# Patient Record
Sex: FEMALE | Race: ASIAN
[De-identification: names, ages, dates, MRNs, and addresses within clinical notes are randomized per-mention and may not be internally consistent; named-entity substitution may affect disease eponyms.]

---

## 2020-01-05 ENCOUNTER — HOSPITAL ENCOUNTER (EMERGENCY)
Dept: HOSPITAL 74 - JER | Age: 40
Discharge: HOME | End: 2020-01-05
Payer: COMMERCIAL

## 2020-01-05 VITALS — TEMPERATURE: 97.9 F

## 2020-01-05 VITALS — DIASTOLIC BLOOD PRESSURE: 75 MMHG | HEART RATE: 89 BPM | SYSTOLIC BLOOD PRESSURE: 107 MMHG

## 2020-01-05 VITALS — BODY MASS INDEX: 23.4 KG/M2

## 2020-01-05 DIAGNOSIS — N39.0: Primary | ICD-10-CM

## 2020-01-05 LAB
ALBUMIN SERPL-MCNC: 3.6 G/DL (ref 3.4–5)
ALP SERPL-CCNC: 73 U/L (ref 45–117)
ALT SERPL-CCNC: 27 U/L (ref 13–61)
ANION GAP SERPL CALC-SCNC: 9 MMOL/L (ref 8–16)
APPEARANCE UR: CLEAR
AST SERPL-CCNC: 31 U/L (ref 15–37)
BACTERIA # UR AUTO: 203.2 /HPF
BASOPHILS # BLD: 0.9 % (ref 0–2)
BILIRUB SERPL-MCNC: 0.4 MG/DL (ref 0.2–1)
BILIRUB UR STRIP.AUTO-MCNC: NEGATIVE MG/DL
BUN SERPL-MCNC: 10.6 MG/DL (ref 7–18)
CALCIUM SERPL-MCNC: 8.9 MG/DL (ref 8.5–10.1)
CASTS URNS QL MICRO: 1 /LPF (ref 0–8)
CHLORIDE SERPL-SCNC: 108 MMOL/L (ref 98–107)
CO2 SERPL-SCNC: 23 MMOL/L (ref 21–32)
COLOR UR: YELLOW
CREAT SERPL-MCNC: 0.7 MG/DL (ref 0.55–1.3)
DEPRECATED RDW RBC AUTO: 12.4 % (ref 11.6–15.6)
EOSINOPHIL # BLD: 1.5 % (ref 0–4.5)
EPITH CASTS URNS QL MICRO: 1.8 /HPF
GLUCOSE SERPL-MCNC: 103 MG/DL (ref 74–106)
HCT VFR BLD CALC: 35.4 % (ref 32.4–45.2)
HGB BLD-MCNC: 11.9 GM/DL (ref 10.7–15.3)
KETONES UR QL STRIP: NEGATIVE
LEUKOCYTE ESTERASE UR QL STRIP.AUTO: (no result)
LYMPHOCYTES # BLD: 30.1 % (ref 8–40)
MAGNESIUM SERPL-MCNC: 2.1 MG/DL (ref 1.8–2.4)
MCH RBC QN AUTO: 30.2 PG (ref 25.7–33.7)
MCHC RBC AUTO-ENTMCNC: 33.7 G/DL (ref 32–36)
MCV RBC: 89.6 FL (ref 80–96)
MONOCYTES # BLD AUTO: 7.8 % (ref 3.8–10.2)
NEUTROPHILS # BLD: 59.7 % (ref 42.8–82.8)
NITRITE UR QL STRIP: NEGATIVE
PH UR: 5.5 [PH] (ref 5–8)
PLATELET # BLD AUTO: 243 K/MM3 (ref 134–434)
PMV BLD: 9.1 FL (ref 7.5–11.1)
POTASSIUM SERPLBLD-SCNC: 4.4 MMOL/L (ref 3.5–5.1)
PROT SERPL-MCNC: 7.1 G/DL (ref 6.4–8.2)
PROT UR QL STRIP: NEGATIVE
PROT UR QL STRIP: NEGATIVE
RBC # BLD AUTO: 2 /HPF (ref 0–4)
RBC # BLD AUTO: 3.95 M/MM3 (ref 3.6–5.2)
SODIUM SERPL-SCNC: 140 MMOL/L (ref 136–145)
SP GR UR: 1.01 (ref 1.01–1.03)
UROBILINOGEN UR STRIP-MCNC: 0.2 MG/DL (ref 0.2–1)
WBC # BLD AUTO: 8.9 K/MM3 (ref 4–10)
WBC # UR AUTO: 40 /HPF (ref 0–5)

## 2020-01-05 NOTE — PDOC
History of Present Illness





- General


Chief Complaint: Palpitations


Stated Complaint: PALPITATIONS


Time Seen by Provider: 01/05/20 03:03


History Source: Patient, Family


Exam Limitations: No Limitations





- History of Present Illness


Initial Comments: 





01/05/20 04:47


39YOF without PMH who p/w one day of sensation of rapid palpitations unlike 

symptoms she has ever had before. Her family is present to assist translating 

and provide her history. They note that at about 1:45 am today, she began 

screaming and saying that she felt like she was going to die because of the 

palpitations. Family note that when they tried to help console her, she seemed 

to become less responsive for a short period of time but never lost 

consciousness. They otherwise deny any f/c/n/v/d/c, change in appetite, cough, 

congestion, CP, AP, back pain, neck pain, change in baseline stress level, or 

any other new symptoms.





Past History





- Past Medical History


Allergies/Adverse Reactions: 


 Allergies











Allergy/AdvReac Type Severity Reaction Status Date / Time


 


No Known Allergies Allergy   Verified 01/05/20 04:29











Home Medications: 


Ambulatory Orders





Cephalexin [Keflex] 500 mg PO BID #13 capsule 01/05/20 








COPD: No


Other medical history: Pt denies





- Psycho Social/Smoking Cessation Hx


Smoking History: Never smoked


Have you smoked in the past 12 months: No


Information on smoking cessation initiated: No


Hx Alcohol Use: No


Drug/Substance Use Hx: No





**Review of Systems





- Review of Systems


Able to Perform ROS?: Yes


Comments:: 





01/05/20 04:53


GEN: no fever, chills, malaise, or generalized weakness


HEENT: no ear pain, congestion, sore throat, vision change, or eye pain


CV: palpitations, no chest pain, lightheadedness, syncope, or edema


RESP: no SOB, wheezing, or cough


GI: no abdominal pain, nausea, vomiting, diarrhea, constipation, or rectal bleed


: no dysuria, hematuria, or discharge 


MSK: no muscle weakness or pain, no joint swelling or pain


NEURO: no headache, vertigo, numbness, tingling, or focal weakness


PSYCH: no SI, HI, or behavior change


SKIN: no jaundice, rash, lesions, or unexplained bruises


ROS otherwise negative except as noted in HPI





*Physical Exam





- Vital Signs


 Last Vital Signs











Temp Pulse Resp BP Pulse Ox


 


 97.9 F   89   16   107/75   97 


 


 01/05/20 02:40  01/05/20 06:07  01/05/20 06:07  01/05/20 06:07  01/05/20 06:07














- Physical Exam





01/05/20 04:54


GENERAL: a bit dehydrated appearing, but A/Ox4, no distress, answers questions 

appropriately, accompanied by  and son who appear supportive


HEENT: PERRLA, EOMI, moist mucous membranes


NECK/BACK: no midline ttp, no spinal stepoff or deformity, no hematoma, full ROM

, neck supple


CARDIOVASCULAR: regular rate/rhythm, no MGR, strong peripheral pulses, 

capillary refill <2 seconds, extremities wwp, no edema


LUNGS/RESPIRATORY: no respiratory distress, CTAB


GI/ABDOMEN: symmetric side-to-side, normoactive BS, soft, no ttp, no midline 

pulsatile masses


: no CVA tenderness


EXTREMITIES: no muscle atrophy, no acute deformity


SKIN: warm and dry, a bit pale, no jaundice, no rash, no bruising, no skin 

breakdown, no cuts, no lesions


NEUROLOGICAL: GCS 15, CN II-XII grossly intact, 5/5 strength proximally and 

distally, no facial droop





**Heart Score/ECG Review


  ** #1





01/05/20 02:44


Sinus rhythm, rate 100, normal axis and intervals, TWF III, no other ST-T 

changes





ED Treatment Course





- LABORATORY


CBC & Chemistry Diagram: 


 01/05/20 04:41





 01/05/20 03:51





- ADDITIONAL ORDERS


Additional order review: 


 











  01/05/20 01/05/20 01/05/20





  04:41 03:56 03:51


 


RBC  3.95   Cancelled


 


MCV  89.6   Cancelled


 


MCHC  33.7   Cancelled


 


RDW  12.4   Cancelled


 


MPV  9.1   Cancelled


 


Neutrophils %  59.7   Cancelled


 


Lymphocytes %  30.1   Cancelled


 


Monocytes %  7.8   Cancelled


 


Eosinophils %  1.5   Cancelled


 


Basophils %  0.9   Cancelled


 


POC Glucometer   102 














- RADIOLOGY


Radiology Studies Ordered: 














 Category Date Time Status


 


 CHEST X-RAY PORTABLE* [RAD] Stat Radiology  01/05/20 03:27 Completed














- Medications


Given in the ED: 


ED Medications














Discontinued Medications














Generic Name Dose Route Start Last Admin





  Trade Name Freq  PRN Reason Stop Dose Admin


 


Cephalexin HCl  500 mg  01/05/20 06:33  01/05/20 06:41





  Keflex -  PO  01/05/20 06:34  500 mg





  ONCE ONE   Administration





     





     





     





     


 


Sodium Chloride  1,000 ml  01/05/20 03:28  01/05/20 03:54





  Normal Saline -  IV  01/05/20 03:29  1,000 ml





  ONCE ONE   Administration





     





     





     





     


 


Sodium Chloride  1,000 ml  01/05/20 05:22  01/05/20 05:25





  Normal Saline -  IV  01/05/20 05:23  1,000 ml





  ONCE ONE   Administration





     





     





     





     














Medical Decision Making





- Medical Decision Making





01/05/20 19:12


Adult female Pt p/w sensation of palpitations.





 Initial Vital Signs











Temp Pulse Resp BP Pulse Ox


 


 97.9 F   101 H  20   119/67   97 


 


 01/05/20 02:40  01/05/20 02:40  01/05/20 02:40  01/05/20 02:40  01/05/20 02:40








Exam: As noted in Physical Exam section.


DDX IBNLT: tachyarrhythmia (e.g. SVT, re-entrant tachycardia, WPW, Brugada, 

long QT, AF/AFL w/ RVR, MAT, ventricular dysrhythmia), ischemia (ACS), 

structural heart condition (MVP, mitral stenosis, atrial enlargement, HOCM), 

anxiety/panic, hypoxia, anemia (e.g. hemorrhage from heavy menstruation, 

ruptured ectopic, etc), PE, PTX, bronchitis/PNA, sepsis/shock, tamponade, 

metabolic (e.g. DKA, hypoglycemia), thyroid condition, catecholamine surge (

e.g. pheochromocytoma), anxiety/panic disorder, medication effect, substance use

, etc.


W/U ordered: Labs EKG CXR Monitor


TX ordered: IVF





EKG: Reviewed; results as noted in ECG Review section.


CXR: Nothing acute





 Laboratory Tests











  01/05/20 01/05/20 01/05/20





  03:51 03:51 03:51


 


WBC   Cancelled 


 


Corrected WBC (auto)   Cancelled 


 


RBC   Cancelled 


 


Hgb   Cancelled 


 


Hct   Cancelled 


 


MCV   Cancelled 


 


MCH   Cancelled 


 


MCHC   Cancelled 


 


RDW   Cancelled 


 


Plt Count   Cancelled 


 


MPV   Cancelled 


 


Absolute Neuts (auto)   Cancelled 


 


Absolute Lymphs (auto)   Cancelled 


 


Absolute Monos (auto)   Cancelled 


 


Absolute Eos (auto)   Cancelled 


 


Absolute Basos (auto)   Cancelled 


 


Add Manual Diff   Cancelled 


 


Neutrophils %   Cancelled 


 


Lymphocytes %   Cancelled 


 


Monocytes %   Cancelled 


 


Eosinophils %   Cancelled 


 


Basophils %   Cancelled 


 


Nucleated RBC %   Cancelled 


 


Platelet Estimate   Cancelled 


 


Platelet Comment   Cancelled 


 


Normal RBC Morphology   Cancelled 


 


Sodium  140  


 


Potassium  4.4  


 


Chloride  108 H  


 


Carbon Dioxide  23  


 


Anion Gap  9  


 


BUN  10.6  


 


Creatinine  0.7  


 


Est GFR (CKD-EPI)AfAm  128.28  


 


Est GFR (CKD-EPI)NonAf  110.69  


 


POC Glucometer   


 


Random Glucose  103  


 


Calcium  8.9  


 


Magnesium  2.1  


 


Total Bilirubin  0.4  


 


AST  31  


 


ALT  27  


 


Alkaline Phosphatase  73  


 


Creatine Kinase    142


 


Troponin I    < 0.02


 


Total Protein  7.1  


 


Albumin  3.6  


 


Urine Color   


 


Urine Appearance   


 


Urine pH   


 


Ur Specific Gravity   


 


Urine Protein   


 


Urine Glucose (UA)   


 


Urine Ketones   


 


Urine Blood   


 


Urine Nitrite   


 


Urine Bilirubin   


 


Urine Urobilinogen   


 


Ur Leukocyte Esterase   


 


Urine WBC (Auto)   


 


Urine RBC (Auto)   


 


Urine Casts (Auto)   


 


U Epithel Cells (Auto)   


 


Urine Bacteria (Auto)   


 


Urine HCG, Qual   














  01/05/20 01/05/20 01/05/20





  03:56 04:41 05:28


 


WBC   8.9 


 


Corrected WBC (auto)   


 


RBC   3.95 


 


Hgb   11.9 


 


Hct   35.4 


 


MCV   89.6 


 


MCH   30.2 


 


MCHC   33.7 


 


RDW   12.4 


 


Plt Count   243 


 


MPV   9.1 


 


Absolute Neuts (auto)   5.3 


 


Absolute Lymphs (auto)   


 


Absolute Monos (auto)   


 


Absolute Eos (auto)   


 


Absolute Basos (auto)   


 


Add Manual Diff   


 


Neutrophils %   59.7 


 


Lymphocytes %   30.1 


 


Monocytes %   7.8 


 


Eosinophils %   1.5 


 


Basophils %   0.9 


 


Nucleated RBC %   0 


 


Platelet Estimate   


 


Platelet Comment   


 


Normal RBC Morphology   


 


Sodium   


 


Potassium   


 


Chloride   


 


Carbon Dioxide   


 


Anion Gap   


 


BUN   


 


Creatinine   


 


Est GFR (CKD-EPI)AfAm   


 


Est GFR (CKD-EPI)NonAf   


 


POC Glucometer  102  


 


Random Glucose   


 


Calcium   


 


Magnesium   


 


Total Bilirubin   


 


AST   


 


ALT   


 


Alkaline Phosphatase   


 


Creatine Kinase   


 


Troponin I   


 


Total Protein   


 


Albumin   


 


Urine Color    Yellow


 


Urine Appearance    Clear


 


Urine pH    5.5


 


Ur Specific Gravity    1.008 L


 


Urine Protein    Negative


 


Urine Glucose (UA)    Negative


 


Urine Ketones    Negative


 


Urine Blood    Negative


 


Urine Nitrite    Negative


 


Urine Bilirubin    Negative


 


Urine Urobilinogen    0.2


 


Ur Leukocyte Esterase    2+ H


 


Urine WBC (Auto)    40


 


Urine RBC (Auto)    2


 


Urine Casts (Auto)    1


 


U Epithel Cells (Auto)    1.8


 


Urine Bacteria (Auto)    203.2


 


Urine HCG, Qual   














  01/05/20





  05:28


 


WBC 


 


Corrected WBC (auto) 


 


RBC 


 


Hgb 


 


Hct 


 


MCV 


 


MCH 


 


MCHC 


 


RDW 


 


Plt Count 


 


MPV 


 


Absolute Neuts (auto) 


 


Absolute Lymphs (auto) 


 


Absolute Monos (auto) 


 


Absolute Eos (auto) 


 


Absolute Basos (auto) 


 


Add Manual Diff 


 


Neutrophils % 


 


Lymphocytes % 


 


Monocytes % 


 


Eosinophils % 


 


Basophils % 


 


Nucleated RBC % 


 


Platelet Estimate 


 


Platelet Comment 


 


Normal RBC Morphology 


 


Sodium 


 


Potassium 


 


Chloride 


 


Carbon Dioxide 


 


Anion Gap 


 


BUN 


 


Creatinine 


 


Est GFR (CKD-EPI)AfAm 


 


Est GFR (CKD-EPI)NonAf 


 


POC Glucometer 


 


Random Glucose 


 


Calcium 


 


Magnesium 


 


Total Bilirubin 


 


AST 


 


ALT 


 


Alkaline Phosphatase 


 


Creatine Kinase 


 


Troponin I 


 


Total Protein 


 


Albumin 


 


Urine Color 


 


Urine Appearance 


 


Urine pH 


 


Ur Specific Gravity 


 


Urine Protein 


 


Urine Glucose (UA) 


 


Urine Ketones 


 


Urine Blood 


 


Urine Nitrite 


 


Urine Bilirubin 


 


Urine Urobilinogen 


 


Ur Leukocyte Esterase 


 


Urine WBC (Auto) 


 


Urine RBC (Auto) 


 


Urine Casts (Auto) 


 


U Epithel Cells (Auto) 


 


Urine Bacteria (Auto) 


 


Urine HCG, Qual  Negative











DISCHARGE


The Pt has gotten significant relief of symptoms while in the ED.


She does not have EKG or other workup findings concerning for life-threatening 

arrhythmia.


The Pt is comfortable with this plan and will follow up with her primary care 

provider in 1-3 days.


She will take OTC pain medications, pyridium, etc. for pain.


E-Rx has been sent to her pharmacy for Keflex course, first dose given in the 

ED.


Specific return precautions are discussed and she will come back to the ED if 

necessary.





Discharge





- Discharge Information


Problems reviewed: Yes


Clinical Impression/Diagnosis: 


UTI (urinary tract infection)


Qualifiers:


 Urinary tract infection type: acute cystitis Hematuria presence: without 

hematuria Qualified Code(s): N30.00 - Acute cystitis without hematuria





Condition: Stable


Disposition: HOME





- Admission


No





- Additional Discharge Information


Prescriptions: 


Cephalexin [Keflex] 500 mg PO BID #13 capsule





- Follow up/Referral


Referrals: 


Fede Castro MD [Primary Care Provider] - 





- Patient Discharge Instructions


Additional Instructions: 


You were seen in the ER for a report of palpitations. We did blood and urine 

labs and you have a urinary tract infection, but there were no other 

abnormalities. We gave you a dose of medication here in the ER and sent the 

remaining prescription to your pharmacy.  and take your prescriptions 

that we are sending electronically to your pharmacy. Drink plenty of fluids 

over the next several days. Follow up with your primary doctor in 1-3 days. 

Call their clinic ASAP, tell them you were seen in the ER, and tell them you 

need an appointment. Please come back to the ER at any time, 24 hours a day, 

for any new or worsening symptoms, like worsened pain, increased or foul-

smelling discharge, fever, genital pain or swelling, or other symptoms. If you 

are having severe or life threatening symptoms, or symptoms that make it unsafe 

to drive or have someone drive you, please call 911.





- Post Discharge Activity

## 2020-01-05 NOTE — EKG
Test Reason : 

Blood Pressure : ***/*** mmHG

Vent. Rate : 100 BPM     Atrial Rate : 100 BPM

   P-R Int : 130 ms          QRS Dur : 072 ms

    QT Int : 332 ms       P-R-T Axes : 051 043 044 degrees

   QTc Int : 428 ms

 

NORMAL SINUS RHYTHM

NORMAL ECG

NO PREVIOUS ECGS AVAILABLE

Confirmed by LENIN BOWERS MD ([id number]) on 1/5/2020 5:00:15 PM

 

Referred By:             Confirmed By:LENIN BOWERS MD